# Patient Record
Sex: MALE | Race: WHITE
[De-identification: names, ages, dates, MRNs, and addresses within clinical notes are randomized per-mention and may not be internally consistent; named-entity substitution may affect disease eponyms.]

---

## 2018-08-27 ENCOUNTER — HOSPITAL ENCOUNTER (OUTPATIENT)
Dept: HOSPITAL 89 - CT | Age: 23
End: 2018-08-27
Attending: PREVENTIVE MEDICINE
Payer: SELF-PAY

## 2018-08-27 ENCOUNTER — HOSPITAL ENCOUNTER (OUTPATIENT)
Dept: HOSPITAL 89 - ZZGRANDUC | Age: 23
End: 2018-08-27
Attending: PREVENTIVE MEDICINE
Payer: SELF-PAY

## 2018-08-27 DIAGNOSIS — S09.90XA: Primary | ICD-10-CM
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
## 2018-08-27 LAB — PLATELET COUNT, AUTOMATED: 235 K/UL (ref 150–450)

## 2018-08-27 PROCEDURE — 70450 CT HEAD/BRAIN W/O DYE: CPT

## 2018-08-27 PROCEDURE — 84460 ALANINE AMINO (ALT) (SGPT): CPT

## 2018-08-27 PROCEDURE — 82247 BILIRUBIN TOTAL: CPT

## 2018-08-27 PROCEDURE — 85025 COMPLETE CBC W/AUTO DIFF WBC: CPT

## 2018-08-27 PROCEDURE — 84155 ASSAY OF PROTEIN SERUM: CPT

## 2018-08-27 PROCEDURE — 82565 ASSAY OF CREATININE: CPT

## 2018-08-27 PROCEDURE — 82374 ASSAY BLOOD CARBON DIOXIDE: CPT

## 2018-08-27 PROCEDURE — 84295 ASSAY OF SERUM SODIUM: CPT

## 2018-08-27 PROCEDURE — 84075 ASSAY ALKALINE PHOSPHATASE: CPT

## 2018-08-27 PROCEDURE — 82310 ASSAY OF CALCIUM: CPT

## 2018-08-27 PROCEDURE — 83036 HEMOGLOBIN GLYCOSYLATED A1C: CPT

## 2018-08-27 PROCEDURE — 84520 ASSAY OF UREA NITROGEN: CPT

## 2018-08-27 PROCEDURE — 84450 TRANSFERASE (AST) (SGOT): CPT

## 2018-08-27 PROCEDURE — 82947 ASSAY GLUCOSE BLOOD QUANT: CPT

## 2018-08-27 PROCEDURE — 82435 ASSAY OF BLOOD CHLORIDE: CPT

## 2018-08-27 PROCEDURE — 82040 ASSAY OF SERUM ALBUMIN: CPT

## 2018-08-27 PROCEDURE — 84132 ASSAY OF SERUM POTASSIUM: CPT

## 2018-08-27 NOTE — RADIOLOGY IMAGING REPORT
FACILITY: [facility] [place] 

 

PATIENT NAME: Sanya Grider

: 1995

MR: [id number]

V: [id number]

EXAM DATE: 

ORDERING PHYSICIAN: NYDIA WALKER

TECHNOLOGIST: 

 

Location: [facility] [place]

Patient: Sanya Grider

: 1995

MRN: [id number]

Visit/Account:[id number]

Date of Sevice:  2018

 

ACCESSION #: [id number]

 

Head CT scan without contrast

 

COMPARISONS: May 20, 2012

 

ADDITIONAL PERTINENT HISTORY: Head trauma 5 weeks ago with continued headaches.

 

TECHNIQUE:  Multiple axial images were obtained from the skull base to the vertex without IV contrast
. One of the following dose optimization techniques was utilized in the performance of this exam: Aut
omated exposure control; adjustment of the mA and/or kV according to the patient's size; or use of an
 iterative  reconstruction technique.  Specific details can be referenced in the facility's radiology
 CT exam operational policy.

 

FINDINGS:

 

Midline shift: Negative

 

Ventricles:  Negative

 

Brain parenchyma:  Negative

 

Extra-axial spaces:  Negative

 

Intracranial vasculature:  Negative

 

Osseous structures:  Negative

 

Paranasal sinuses and mastoid air cells:  Negative

 

Surrounding soft tissues and orbits:  Negative

 

 

IMPRESSION: Normal head CT scan without contrast.

 

Report Dictated By: Clifford Mckeon MD at 2018 12:26 PM

 

Report E-Signed By: Clifford Mckeon MD  at 2018 12:28 PM

 

WSN:[id number]